# Patient Record
Sex: FEMALE | URBAN - METROPOLITAN AREA
[De-identification: names, ages, dates, MRNs, and addresses within clinical notes are randomized per-mention and may not be internally consistent; named-entity substitution may affect disease eponyms.]

---

## 2023-03-17 ENCOUNTER — WEB ENCOUNTER (OUTPATIENT)
Dept: URBAN - METROPOLITAN AREA CLINIC 82 | Facility: CLINIC | Age: 43
End: 2023-03-17

## 2023-03-17 ENCOUNTER — OFFICE VISIT (OUTPATIENT)
Dept: URBAN - METROPOLITAN AREA CLINIC 82 | Facility: CLINIC | Age: 43
End: 2023-03-17
Payer: COMMERCIAL

## 2023-03-17 ENCOUNTER — LAB OUTSIDE AN ENCOUNTER (OUTPATIENT)
Dept: URBAN - METROPOLITAN AREA CLINIC 82 | Facility: CLINIC | Age: 43
End: 2023-03-17

## 2023-03-17 VITALS
DIASTOLIC BLOOD PRESSURE: 78 MMHG | WEIGHT: 249.4 LBS | HEART RATE: 58 BPM | TEMPERATURE: 97.2 F | BODY MASS INDEX: 44.19 KG/M2 | HEIGHT: 63 IN | SYSTOLIC BLOOD PRESSURE: 118 MMHG

## 2023-03-17 DIAGNOSIS — R10.11 RUQ ABDOMINAL PAIN: ICD-10-CM

## 2023-03-17 DIAGNOSIS — K80.20 CALCULUS OF GALLBLADDER WITHOUT CHOLECYSTITIS WITHOUT OBSTRUCTION: ICD-10-CM

## 2023-03-17 DIAGNOSIS — K58.8 OTHER IRRITABLE BOWEL SYNDROME: ICD-10-CM

## 2023-03-17 DIAGNOSIS — R11.0 NAUSEA: ICD-10-CM

## 2023-03-17 PROBLEM — 422587007: Status: ACTIVE | Noted: 2023-03-17

## 2023-03-17 PROBLEM — 70342003: Status: ACTIVE | Noted: 2023-03-17

## 2023-03-17 PROBLEM — 10743008: Status: ACTIVE | Noted: 2023-03-17

## 2023-03-17 PROCEDURE — 99204 OFFICE O/P NEW MOD 45 MIN: CPT | Performed by: INTERNAL MEDICINE

## 2023-03-17 PROCEDURE — 99244 OFF/OP CNSLTJ NEW/EST MOD 40: CPT | Performed by: INTERNAL MEDICINE

## 2023-03-17 RX ORDER — POLYETHYLENE GLYCOL 3350 17 G/DOSE
AS DIRECTED PRIOR TO COLONSOCOPY POWDER (GRAM) ORAL ONCE A DAY
Qty: 238  GRAM | Refills: 0 | OUTPATIENT
Start: 2023-03-17 | End: 2023-03-18

## 2023-03-17 RX ORDER — URSODIOL 400 MG/1
1 CAPSULE CAPSULE ORAL TWICE A DAY
Qty: 60 CAPSULE | Refills: 5 | OUTPATIENT
Start: 2023-03-17

## 2023-03-17 RX ORDER — FAMOTIDINE 20 MG/1
1 TABLET AT BEDTIME AS NEEDED TABLET, FILM COATED ORAL ONCE A DAY
Qty: 30 TABLET | Refills: 3 | OUTPATIENT
Start: 2023-03-17

## 2023-03-17 RX ORDER — DICYCLOMINE HYDROCHLORIDE 10 MG/1
TAKE 1 CAPSULE BY MOUTH THREE TIMES A DAY CAPSULE ORAL
Qty: 90 CAPSULE | Refills: 1 | OUTPATIENT
Start: 2023-03-17 | End: 2023-05-16

## 2023-03-17 NOTE — HPI-TODAY'S VISIT:
42-year-old female was seen today for the evaluation of having nausea vomiting and diarrhea that started in November 2022.  Ever since she has had episodes of abdominal bloating discomfort cramping and right upper quadrant pain.  Patient stated she has had off and on GI issues since when she was young however recently her symptoms have got worse after she had gastroenteritis episode in November.  Patient stated in the past she was recommended to to have gallstones no surgery because of the gallstones and however she does not want to have any procedures done that involved surgery.  Patient reports having intermittent epigastric fullness and discomfort.  Did not only one episode of right upper quadrant pain.  Patient was prescribed omeprazole without any relief.  Patient also reports occasional nausea and epigastric fullness.  Patient reports that she did not tolerate omeprazole because it caused her significant tingling in her mouth as well as severe bloating and headaches.  She quit taking omeprazole.  Patient also reports having abdominal bloating.  She gained 60 pounds in the past 2 years however trying to lose weight and lost about 20 pounds.  Patient admits to having increased stress and anxiety and she had been Cutting down on gluten since it has improved with her abdominal bloating.  Denies any diarrhea on a frequent basis.  She has not had any prior GI work-up.

## 2023-03-21 ENCOUNTER — TELEPHONE ENCOUNTER (OUTPATIENT)
Dept: URBAN - METROPOLITAN AREA CLINIC 82 | Facility: CLINIC | Age: 43
End: 2023-03-21

## 2023-03-23 LAB
IMMUNOGLOBULIN A: 131
INTERPRETATION: (no result)
TISSUE TRANSGLUTAMINASE AB, IGA: 4.6

## 2023-05-05 ENCOUNTER — TELEPHONE ENCOUNTER (OUTPATIENT)
Dept: URBAN - METROPOLITAN AREA CLINIC 82 | Facility: CLINIC | Age: 43
End: 2023-05-05

## 2023-05-09 ENCOUNTER — CLAIMS CREATED FROM THE CLAIM WINDOW (OUTPATIENT)
Dept: URBAN - METROPOLITAN AREA CLINIC 4 | Facility: CLINIC | Age: 43
End: 2023-05-09
Payer: COMMERCIAL

## 2023-05-09 ENCOUNTER — OFFICE VISIT (OUTPATIENT)
Dept: URBAN - METROPOLITAN AREA SURGERY CENTER 13 | Facility: SURGERY CENTER | Age: 43
End: 2023-05-09
Payer: COMMERCIAL

## 2023-05-09 ENCOUNTER — TELEPHONE ENCOUNTER (OUTPATIENT)
Dept: URBAN - METROPOLITAN AREA CLINIC 82 | Facility: CLINIC | Age: 43
End: 2023-05-09

## 2023-05-09 DIAGNOSIS — K29.70 GASTRITIS, UNSPECIFIED, WITHOUT BLEEDING: ICD-10-CM

## 2023-05-09 DIAGNOSIS — R10.32 ABDOMINAL PAIN, LLQ: ICD-10-CM

## 2023-05-09 DIAGNOSIS — K29.60 OTHER GASTRITIS WITHOUT BLEEDING: ICD-10-CM

## 2023-05-09 DIAGNOSIS — R10.31 ABDOMINAL PAIN, CHRONIC, RIGHT LOWER QUADRANT: ICD-10-CM

## 2023-05-09 DIAGNOSIS — K63.89 OTHER SPECIFIED DISEASES OF INTESTINE: ICD-10-CM

## 2023-05-09 PROCEDURE — 88305 TISSUE EXAM BY PATHOLOGIST: CPT | Performed by: PATHOLOGY

## 2023-05-09 PROCEDURE — G8907 PT DOC NO EVENTS ON DISCHARG: HCPCS | Performed by: INTERNAL MEDICINE

## 2023-05-09 PROCEDURE — 45380 COLONOSCOPY AND BIOPSY: CPT | Performed by: INTERNAL MEDICINE

## 2023-05-09 PROCEDURE — 43239 EGD BIOPSY SINGLE/MULTIPLE: CPT | Performed by: INTERNAL MEDICINE

## 2023-05-09 PROCEDURE — 88342 IMHCHEM/IMCYTCHM 1ST ANTB: CPT | Performed by: PATHOLOGY

## 2023-05-09 RX ORDER — PANTOPRAZOLE SODIUM 40 MG/1
1 TABLET TABLET, DELAYED RELEASE ORAL ONCE A DAY
Qty: 30 TABLET | Refills: 1 | OUTPATIENT
Start: 2023-05-09

## 2023-05-09 RX ORDER — DICYCLOMINE HYDROCHLORIDE 10 MG/1
TAKE 1 CAPSULE BY MOUTH THREE TIMES A DAY CAPSULE ORAL
Qty: 90 CAPSULE | Refills: 1 | Status: ACTIVE | COMMUNITY
Start: 2023-03-17 | End: 2023-05-16

## 2023-05-09 RX ORDER — FAMOTIDINE 20 MG/1
1 TABLET AT BEDTIME AS NEEDED TABLET, FILM COATED ORAL ONCE A DAY
Qty: 30 TABLET | Refills: 3 | Status: ACTIVE | COMMUNITY
Start: 2023-03-17

## 2023-05-09 RX ORDER — URSODIOL 400 MG/1
1 CAPSULE CAPSULE ORAL TWICE A DAY
Qty: 60 CAPSULE | Refills: 5 | Status: ACTIVE | COMMUNITY
Start: 2023-03-17

## 2023-05-16 LAB
ANCA SCREEN: NEGATIVE
C-REACTIVE PROTEIN, QUANT: 2.7
MYELOPEROXIDASE ANTIBODY: <1
PROTEINASE-3 ANTIBODY: <1
SACCHAROMYCES CEREVISIAE AB (ASCA) (IGA): 7.3
SACCHAROMYCES CEREVISIAE AB (ASCA) (IGG): 10.4

## 2023-05-31 ENCOUNTER — DASHBOARD ENCOUNTERS (OUTPATIENT)
Age: 43
End: 2023-05-31

## 2023-06-01 ENCOUNTER — OFFICE VISIT (OUTPATIENT)
Dept: URBAN - METROPOLITAN AREA CLINIC 82 | Facility: CLINIC | Age: 43
End: 2023-06-01
Payer: COMMERCIAL

## 2023-06-01 ENCOUNTER — LAB OUTSIDE AN ENCOUNTER (OUTPATIENT)
Dept: URBAN - METROPOLITAN AREA CLINIC 82 | Facility: CLINIC | Age: 43
End: 2023-06-01

## 2023-06-01 VITALS
SYSTOLIC BLOOD PRESSURE: 101 MMHG | BODY MASS INDEX: 43.23 KG/M2 | HEIGHT: 63 IN | DIASTOLIC BLOOD PRESSURE: 70 MMHG | TEMPERATURE: 97.2 F | WEIGHT: 244 LBS | HEART RATE: 66 BPM

## 2023-06-01 DIAGNOSIS — K63.3 EROSION OF TERMINAL ILEUM: ICD-10-CM

## 2023-06-01 DIAGNOSIS — K50.00 TERMINAL ILEITIS WITHOUT COMPLICATION: ICD-10-CM

## 2023-06-01 DIAGNOSIS — Z79.1 NSAID LONG-TERM USE: ICD-10-CM

## 2023-06-01 DIAGNOSIS — K80.20 GALLSTONES: ICD-10-CM

## 2023-06-01 DIAGNOSIS — K29.60 EROSIVE GASTRITIS: ICD-10-CM

## 2023-06-01 PROBLEM — 235709008: Status: ACTIVE | Noted: 2023-06-01

## 2023-06-01 PROBLEM — 305058001: Status: ACTIVE | Noted: 2023-06-01

## 2023-06-01 PROBLEM — 235919008: Status: ACTIVE | Noted: 2023-06-01

## 2023-06-01 PROBLEM — 1086791000119100: Status: ACTIVE | Noted: 2023-06-01

## 2023-06-01 PROCEDURE — 99214 OFFICE O/P EST MOD 30 MIN: CPT | Performed by: INTERNAL MEDICINE

## 2023-06-01 RX ORDER — PANTOPRAZOLE SODIUM 40 MG/1
1 TABLET TABLET, DELAYED RELEASE ORAL ONCE A DAY
Qty: 30 TABLET | Refills: 1 | Status: ACTIVE | COMMUNITY
Start: 2023-05-09

## 2023-06-01 RX ORDER — MISOPROSTOL 100 UG/1
1 TABLET TABLET ORAL TWICE A DAY
Qty: 60 TABLET | Refills: 1 | OUTPATIENT
Start: 2023-06-01 | End: 2023-07-31

## 2023-06-01 RX ORDER — URSODIOL 400 MG/1
1 CAPSULE CAPSULE ORAL TWICE A DAY
Qty: 60 CAPSULE | Refills: 5 | Status: ACTIVE | COMMUNITY
Start: 2023-03-17

## 2023-06-01 RX ORDER — FAMOTIDINE 20 MG/1
1 TABLET AT BEDTIME AS NEEDED TABLET, FILM COATED ORAL ONCE A DAY
Qty: 30 TABLET | Refills: 3 | Status: ACTIVE | COMMUNITY
Start: 2023-03-17

## 2023-06-01 RX ORDER — MESALAMINE 500 MG/1
2 CAPSULES CAPSULE ORAL TWICE A DAY
Qty: 120 CAPSULE | Refills: 3 | OUTPATIENT
Start: 2023-06-01 | End: 2023-09-29

## 2023-06-01 NOTE — HPI-TODAY'S VISIT:
42-year-old female was seen today for the evaluation of having nausea vomiting and diarrhea that started in November 2022.  Ever since she has had episodes of abdominal bloating discomfort cramping and right upper quadrant pain.  Patient stated she has had off and on GI issues since when she was young however recently her symptoms have got worse after she had gastroenteritis episode in November.  Patient stated in the past she was recommended to to have gallstones no surgery because of the gallstones and however she does not want to have any procedures done that involved surgery.  Patient reports having intermittent epigastric fullness and discomfort.  Did not only one episode of right upper quadrant pain.  Patient was prescribed omeprazole without any relief.  Patient also reports occasional nausea and epigastric fullness.  Patient reports that she did not tolerate omeprazole because it caused her significant tingling in her mouth as well as severe bloating and headaches.  She quit taking omeprazole.  Patient also reports having abdominal bloating.  She gained 60 pounds in the past 2 years however trying to lose weight and lost about 20 pounds.  Patient admits to having increased stress and anxiety and she had been Cutting down on gluten since it has improved with her abdominal bloating.  Denies any diarrhea on a frequent basis.  She has not had any prior GI work-up.   6/1/23 Ms. Horn is a 43-year-old female came into the office with the complaints of having right lower quadrant abdominal pain.  Patient stated this pain is slightly different from previous discomfort of epigastric.  Patient stated it started actually after after having colonoscopy evaluation.  Patient stated her symptoms gets worse when she is constipated and improves after having a bowel movement.  Upper endoscopy showed erosive gastritis and hiatal hernia.  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  Patient reports occasional NSAID use but not on a frequent basis denies any family history of inflammatory bowel disease.  No evidence of H. pylori gastritis.  Patient also denies any frequent right upper quadrant pain.  Patient stated she does not have any classic biliary colic symptoms she was noted to have history of gallstones for which she did not want to pursue cholecystectomy and hence she has been on Relafen for at least 3 months.  IBD panel was negative for Crohn's disease or inflammatory bowel disease.

## 2023-06-02 LAB
A/G RATIO: 1.5
ALBUMIN: 4
ALKALINE PHOSPHATASE: 75
ALT (SGPT): 7
AST (SGOT): 13
BILIRUBIN, TOTAL: 0.2
BUN/CREATININE RATIO: (no result)
BUN: 16
CALCIUM: 9.4
CARBON DIOXIDE, TOTAL: 25
CHLORIDE: 106
CREATININE: 0.85
EGFR: 87
GLOBULIN, TOTAL: 2.7
GLUCOSE: 85
POTASSIUM: 4.6
PROTEIN, TOTAL: 6.7
SODIUM: 140

## 2023-07-14 ENCOUNTER — OFFICE VISIT (OUTPATIENT)
Dept: URBAN - METROPOLITAN AREA CLINIC 81 | Facility: CLINIC | Age: 43
End: 2023-07-14

## 2023-09-15 ENCOUNTER — OFFICE VISIT (OUTPATIENT)
Dept: URBAN - METROPOLITAN AREA CLINIC 81 | Facility: CLINIC | Age: 43
End: 2023-09-15
Payer: COMMERCIAL

## 2023-09-15 DIAGNOSIS — K80.20 CHOLELITHIASIS: ICD-10-CM

## 2023-09-15 PROCEDURE — 76705 ECHO EXAM OF ABDOMEN: CPT | Performed by: INTERNAL MEDICINE
